# Patient Record
Sex: FEMALE | Race: OTHER | Employment: STUDENT | URBAN - METROPOLITAN AREA
[De-identification: names, ages, dates, MRNs, and addresses within clinical notes are randomized per-mention and may not be internally consistent; named-entity substitution may affect disease eponyms.]

---

## 2024-01-24 ENCOUNTER — OFFICE VISIT (OUTPATIENT)
Dept: PRIMARY CARE CLINIC | Age: 19
End: 2024-01-24

## 2024-01-24 VITALS
HEART RATE: 70 BPM | DIASTOLIC BLOOD PRESSURE: 64 MMHG | TEMPERATURE: 98 F | OXYGEN SATURATION: 98 % | HEIGHT: 59 IN | BODY MASS INDEX: 22.78 KG/M2 | WEIGHT: 113 LBS | SYSTOLIC BLOOD PRESSURE: 104 MMHG

## 2024-01-24 DIAGNOSIS — H66.002 NON-RECURRENT ACUTE SUPPURATIVE OTITIS MEDIA OF LEFT EAR WITHOUT SPONTANEOUS RUPTURE OF TYMPANIC MEMBRANE: Primary | ICD-10-CM

## 2024-01-24 PROCEDURE — 99203 OFFICE O/P NEW LOW 30 MIN: CPT | Performed by: NURSE PRACTITIONER

## 2024-01-24 RX ORDER — AZITHROMYCIN 250 MG/1
250 TABLET, FILM COATED ORAL SEE ADMIN INSTRUCTIONS
Qty: 6 TABLET | Refills: 0 | Status: SHIPPED | OUTPATIENT
Start: 2024-01-24 | End: 2024-01-29

## 2024-01-24 SDOH — ECONOMIC STABILITY: INCOME INSECURITY: HOW HARD IS IT FOR YOU TO PAY FOR THE VERY BASICS LIKE FOOD, HOUSING, MEDICAL CARE, AND HEATING?: NOT HARD AT ALL

## 2024-01-24 SDOH — ECONOMIC STABILITY: FOOD INSECURITY: WITHIN THE PAST 12 MONTHS, YOU WORRIED THAT YOUR FOOD WOULD RUN OUT BEFORE YOU GOT MONEY TO BUY MORE.: NEVER TRUE

## 2024-01-24 SDOH — ECONOMIC STABILITY: HOUSING INSECURITY
IN THE LAST 12 MONTHS, WAS THERE A TIME WHEN YOU DID NOT HAVE A STEADY PLACE TO SLEEP OR SLEPT IN A SHELTER (INCLUDING NOW)?: NO

## 2024-01-24 SDOH — ECONOMIC STABILITY: FOOD INSECURITY: WITHIN THE PAST 12 MONTHS, THE FOOD YOU BOUGHT JUST DIDN'T LAST AND YOU DIDN'T HAVE MONEY TO GET MORE.: NEVER TRUE

## 2024-01-24 ASSESSMENT — PATIENT HEALTH QUESTIONNAIRE - PHQ9
SUM OF ALL RESPONSES TO PHQ QUESTIONS 1-9: 0
1. LITTLE INTEREST OR PLEASURE IN DOING THINGS: 0
SUM OF ALL RESPONSES TO PHQ9 QUESTIONS 1 & 2: 0
2. FEELING DOWN, DEPRESSED OR HOPELESS: 0
SUM OF ALL RESPONSES TO PHQ QUESTIONS 1-9: 0

## 2024-01-24 NOTE — PROGRESS NOTES
clear.  Nose: There is no congestion of the nasal mucosa.  Throat:  Posterior pharynx without injection, exudate, or tonsillar hypertrophy.  Airway patient.  Neck:  Normal ROM.  Supple.  No adenopathy.    Respiratory:  CTAB without wheezing, rales, or rhonchi  CV: Regular rate and rhythm, normal heart sounds, without pathological murmurs, ectopy, gallops, or rubs.  Skin:  Moist and warm without rashes or lesions.  Lymphatic: No lymphangitis or adenopathy noted.  Neurological:  Oriented.  Motor functions intact.    Test Results Section   (All laboratory and radiology results have been personally reviewed by myself)  Labs:  No results found for this visit on 01/24/24.    Assessment / Plan   Impression(s):  Barbara was seen today for otalgia and ear fullness.    Diagnoses and all orders for this visit:    Non-recurrent acute suppurative otitis media of left ear without spontaneous rupture of tympanic membrane  -     azithromycin (ZITHROMAX) 250 MG tablet; Take 1 tablet by mouth See Admin Instructions for 5 days 500mg on day 1 followed by 250mg on days 2 - 5    Script written for azithromycin, side effects discussed. Increase fluids and rest. Additional symptomatic relief discussed. F/u PCP in 5-7 days if symptoms persist. ED sooner if symptoms worsen or change. ED immediately with fever, worsening ear pain, CP, dyspnea, or dysphagia. Pt is in agreement with this care plan. All questions answered.    Return if symptoms worsen or fail to improve.    Electronically signed by JON Trammell CNP   DD: 1/24/24    **This report was transcribed using voice recognition software. Every effort was made to ensure accuracy; however, inadvertent computerized transcription errors may be present.

## 2024-08-27 ENCOUNTER — OFFICE VISIT (OUTPATIENT)
Dept: PRIMARY CARE CLINIC | Age: 19
End: 2024-08-27

## 2024-08-27 VITALS
SYSTOLIC BLOOD PRESSURE: 111 MMHG | BODY MASS INDEX: 21.33 KG/M2 | OXYGEN SATURATION: 100 % | WEIGHT: 105.8 LBS | RESPIRATION RATE: 16 BRPM | HEART RATE: 75 BPM | HEIGHT: 59 IN | DIASTOLIC BLOOD PRESSURE: 69 MMHG | TEMPERATURE: 97.5 F

## 2024-08-27 DIAGNOSIS — Z02.0 SCHOOL PHYSICAL EXAM: Primary | ICD-10-CM

## 2024-08-27 DIAGNOSIS — Z11.1 SCREENING FOR TUBERCULOSIS: ICD-10-CM

## 2024-08-27 DIAGNOSIS — Z23 NEED FOR INFLUENZA VACCINATION: ICD-10-CM

## 2024-08-27 DIAGNOSIS — Z01.84 IMMUNITY STATUS TESTING: ICD-10-CM

## 2024-08-27 DIAGNOSIS — Z23 NEED FOR DTAP VACCINATION: ICD-10-CM

## 2024-08-27 NOTE — PROGRESS NOTES
48 kg (105 lb 12.8 oz)   LMP 08/02/2024   SpO2 100%   BMI 21.37 kg/m²    Oxygen Saturation Interpretation: Normal.    Constitutional:  A&Ox3, NAD, development consistent with age.  Head:  NCAT  Eyes:  EOMI, PERRLA. No conjunctival injection noted.  Ears:  External ears without lesions. Ear canals without swelling or exudate. TMs translucent bilaterally with normal light reflex.  Throat:  Posterior pharynx without erythema or exudate.  Airway patient.  Neck:  Supple. Nontender without adenopathy or thyromegaly.  Lungs: CTAB without wheezing, rales, or rhonchi.  Heart:  RRR, normal heart sounds without pathological murmurs.   Abdomen:  Soft, nontender, and nondistended. BS+x4. No guarding, rigidity, or rebound tenderness. No masses.  Back:  ROM physiologic. Normal posture and spinal alignment. No costovertebral, paravertebral, intervertebral, or vertebral tenderness or spasm.  Extremities:  No tenderness or swelling. ROM physiologic.  No neurovascular deficit.  Strength and  5/5 bilaterally.  Skin:  Warm and appropriately dry without rashes, abrasions, ecchymoses, or lesions.  Neuro:  Orientation age-appropriate.  Motor functions intact. DTRs 2+ and equal bilaterally.  Psych: Pleasant and appropriate. Normal mood and affect.    Test Results Section   (All laboratory and radiology results have been personally reviewed by myself)  Labs:  No results found for this visit on 08/27/24.    Imaging:  All Radiology results interpreted by Radiologist unless otherwise noted.  No results found.    Visual Acuity:  Vision Screening    Right eye Left eye Both eyes   Without correction 20/50 20/200 20/50   With correction           Assessment / Plan   Impression(s):  Barbara was seen today for annual exam.    Diagnoses and all orders for this visit:    School physical exam    Immunity status testing  -     Measles/Mumps/Rubella Immunity  -     Varicella Zoster Antibody, IgG  -     Cancel: MISCELLANEOUS SENDOUT Hepatitis B  Antibody titer [facility] #[id number]; Future    Screening for tuberculosis  -     T-Spot TB Test    Need for DTaP vaccination  -     Tdap, BOOSTRIX, (age 10 yrs+), IM    Need for influenza vaccination  -     Influenza, FLUCELVAX Trivalent, (age 6 mo+) IM, Preservative Free, 0.5mL      Pt appears to be in good health overall. 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 Advised to return immediately with any changes in physical or mental health. All questions answered.    Electronically signed by JON Weir CNP   DD: 8/27/24    **This report was transcribed using voice recognition software. Every effort was made to ensure accuracy; however, inadvertent computerized transcription errors may be present.

## 2024-08-30 ENCOUNTER — LAB (OUTPATIENT)
Dept: PRIMARY CARE CLINIC | Age: 19
End: 2024-08-30

## 2024-08-30 LAB — HBV SURFACE AB TITR SER: 144.51 MIU/ML (ref 0–9.99)

## 2024-09-03 LAB
MEASLES ANTIBODY IGG: NORMAL
MUV IGG SER QL: NORMAL
RUBV IGG SER QL: NORMAL IU/ML
T SPOT TB TEST: NORMAL
VZV IGG SER QL IA: ABNORMAL

## 2024-09-06 ENCOUNTER — LAB (OUTPATIENT)
Dept: PRIMARY CARE CLINIC | Age: 19
End: 2024-09-06
Payer: COMMERCIAL

## 2024-09-06 DIAGNOSIS — Z23 NEED FOR VARICELLA VACCINE: Primary | ICD-10-CM

## 2024-09-06 PROCEDURE — 90471 IMMUNIZATION ADMIN: CPT | Performed by: FAMILY MEDICINE

## 2024-09-06 PROCEDURE — 90716 VAR VACCINE LIVE SUBQ: CPT | Performed by: FAMILY MEDICINE

## 2024-10-07 ENCOUNTER — LAB (OUTPATIENT)
Dept: PRIMARY CARE CLINIC | Age: 19
End: 2024-10-07
Payer: COMMERCIAL

## 2024-10-07 DIAGNOSIS — Z23 NEED FOR VARICELLA VACCINE: Primary | ICD-10-CM

## 2024-10-07 PROCEDURE — 90471 IMMUNIZATION ADMIN: CPT | Performed by: FAMILY MEDICINE

## 2024-10-07 PROCEDURE — 90716 VAR VACCINE LIVE SUBQ: CPT | Performed by: FAMILY MEDICINE

## 2025-08-11 ENCOUNTER — LAB (OUTPATIENT)
Dept: PRIMARY CARE CLINIC | Age: 20
End: 2025-08-11

## 2025-08-11 DIAGNOSIS — Z11.1 SCREENING FOR TUBERCULOSIS: Primary | ICD-10-CM

## 2025-08-16 LAB — T SPOT TB TEST: NORMAL
